# Patient Record
Sex: FEMALE | Race: WHITE | NOT HISPANIC OR LATINO | Employment: FULL TIME | URBAN - METROPOLITAN AREA
[De-identification: names, ages, dates, MRNs, and addresses within clinical notes are randomized per-mention and may not be internally consistent; named-entity substitution may affect disease eponyms.]

---

## 2024-01-09 ENCOUNTER — OFFICE VISIT (OUTPATIENT)
Dept: PRIMARY CARE | Facility: CLINIC | Age: 34
End: 2024-01-09
Payer: COMMERCIAL

## 2024-01-09 VITALS
SYSTOLIC BLOOD PRESSURE: 128 MMHG | RESPIRATION RATE: 18 BRPM | HEART RATE: 89 BPM | DIASTOLIC BLOOD PRESSURE: 80 MMHG | TEMPERATURE: 96.8 F | BODY MASS INDEX: 31.58 KG/M2 | WEIGHT: 185 LBS | OXYGEN SATURATION: 98 % | HEIGHT: 64 IN

## 2024-01-09 DIAGNOSIS — E03.9 HYPOTHYROIDISM, UNSPECIFIED TYPE: ICD-10-CM

## 2024-01-09 DIAGNOSIS — E66.9 OBESITY, UNSPECIFIED CLASSIFICATION, UNSPECIFIED OBESITY TYPE, UNSPECIFIED WHETHER SERIOUS COMORBIDITY PRESENT: Primary | ICD-10-CM

## 2024-01-09 PROCEDURE — 84443 ASSAY THYROID STIM HORMONE: CPT | Performed by: FAMILY MEDICINE

## 2024-01-09 PROCEDURE — 36415 COLL VENOUS BLD VENIPUNCTURE: CPT | Performed by: FAMILY MEDICINE

## 2024-01-09 PROCEDURE — 1036F TOBACCO NON-USER: CPT | Performed by: FAMILY MEDICINE

## 2024-01-09 PROCEDURE — 99213 OFFICE O/P EST LOW 20 MIN: CPT | Performed by: FAMILY MEDICINE

## 2024-01-09 RX ORDER — LEVOTHYROXINE SODIUM 50 UG/1
50 CAPSULE ORAL
Qty: 90 CAPSULE | Refills: 3 | Status: SHIPPED | OUTPATIENT
Start: 2024-01-09

## 2024-01-09 RX ORDER — SEMAGLUTIDE 1.34 MG/ML
INJECTION, SOLUTION SUBCUTANEOUS
COMMUNITY

## 2024-01-09 RX ORDER — LEVOTHYROXINE SODIUM 50 UG/1
CAPSULE ORAL
COMMUNITY
End: 2024-01-09 | Stop reason: SDUPTHER

## 2024-01-09 ASSESSMENT — PAIN SCALES - GENERAL: PAINLEVEL: 0-NO PAIN

## 2024-01-09 ASSESSMENT — PATIENT HEALTH QUESTIONNAIRE - PHQ9
SUM OF ALL RESPONSES TO PHQ9 QUESTIONS 1 AND 2: 0
2. FEELING DOWN, DEPRESSED OR HOPELESS: NOT AT ALL
1. LITTLE INTEREST OR PLEASURE IN DOING THINGS: NOT AT ALL

## 2024-01-09 NOTE — PROGRESS NOTES
"Subjective   Patient ID: Alda Sullivan is a 33 y.o. female who presents for Med Refill (Pt here to discuss medications).    Med Refill         Review of Systems    Objective   /80 (BP Location: Left arm, Patient Position: Sitting, BP Cuff Size: Adult)   Pulse 89   Temp 36 °C (96.8 °F) (Temporal)   Resp 18   Ht 1.626 m (5' 4\")   Wt 83.9 kg (185 lb)   SpO2 98%   BMI 31.76 kg/m²     Physical Exam  Constitutional:       General: She is not in acute distress.     Appearance: Normal appearance.   Cardiovascular:      Rate and Rhythm: Normal rate and regular rhythm.      Heart sounds: No murmur heard.  Pulmonary:      Breath sounds: Normal breath sounds. No wheezing.   Neurological:      Mental Status: She is alert.         Assessment/Plan   Problem List Items Addressed This Visit             ICD-10-CM    Obesity - Primary E66.9          "

## 2024-01-13 LAB — TSH SERPL DL<=0.05 MIU/L-ACNC: 1.89 MIU/L (ref 0.27–4.2)

## 2024-12-05 ENCOUNTER — APPOINTMENT (OUTPATIENT)
Dept: PRIMARY CARE | Facility: CLINIC | Age: 34
End: 2024-12-05
Payer: COMMERCIAL

## 2025-01-11 DIAGNOSIS — E66.9 OBESITY: ICD-10-CM

## 2025-01-13 RX ORDER — SEMAGLUTIDE 2.68 MG/ML
2 INJECTION, SOLUTION SUBCUTANEOUS
Qty: 12 ML | Refills: 3 | Status: SHIPPED | OUTPATIENT
Start: 2025-01-19

## 2025-01-14 ENCOUNTER — APPOINTMENT (OUTPATIENT)
Dept: OBSTETRICS AND GYNECOLOGY | Facility: CLINIC | Age: 35
End: 2025-01-14
Payer: COMMERCIAL

## 2025-01-14 VITALS
DIASTOLIC BLOOD PRESSURE: 72 MMHG | BODY MASS INDEX: 27.83 KG/M2 | SYSTOLIC BLOOD PRESSURE: 124 MMHG | WEIGHT: 163 LBS | HEIGHT: 64 IN

## 2025-01-14 DIAGNOSIS — Z30.9 ENCOUNTER FOR CONTRACEPTIVE MANAGEMENT, UNSPECIFIED TYPE: ICD-10-CM

## 2025-01-14 DIAGNOSIS — Z01.419 WELL WOMAN EXAM: Primary | ICD-10-CM

## 2025-01-14 DIAGNOSIS — Z12.4 CERVICAL CANCER SCREENING: ICD-10-CM

## 2025-01-14 DIAGNOSIS — N93.9 ABNORMAL UTERINE BLEEDING (AUB): ICD-10-CM

## 2025-01-14 PROBLEM — Z98.891 H/O CESAREAN SECTION: Status: RESOLVED | Noted: 2025-01-14 | Resolved: 2025-01-14

## 2025-01-14 PROBLEM — N92.1 MENORRHAGIA WITH IRREGULAR CYCLE: Status: RESOLVED | Noted: 2025-01-14 | Resolved: 2025-01-14

## 2025-01-14 PROBLEM — E55.9 VITAMIN D DEFICIENCY: Status: RESOLVED | Noted: 2025-01-14 | Resolved: 2025-01-14

## 2025-01-14 PROBLEM — G44.209 MUSCLE CONTRACTION HEADACHE: Status: ACTIVE | Noted: 2025-01-14

## 2025-01-14 PROBLEM — E66.9 OBESITY: Status: RESOLVED | Noted: 2024-01-09 | Resolved: 2025-01-14

## 2025-01-14 PROBLEM — N92.6 IRREGULAR MENSES: Status: RESOLVED | Noted: 2025-01-14 | Resolved: 2025-01-14

## 2025-01-14 PROBLEM — E03.9 HYPOTHYROIDISM: Status: ACTIVE | Noted: 2025-01-14

## 2025-01-14 PROBLEM — N94.6 DYSMENORRHEA: Status: RESOLVED | Noted: 2025-01-14 | Resolved: 2025-01-14

## 2025-01-14 PROBLEM — K21.9 GASTRO-ESOPHAGEAL REFLUX DISEASE WITHOUT ESOPHAGITIS: Status: ACTIVE | Noted: 2025-01-14

## 2025-01-14 PROBLEM — R63.2 POLYPHAGIA: Status: RESOLVED | Noted: 2025-01-14 | Resolved: 2025-01-14

## 2025-01-14 PROCEDURE — 99385 PREV VISIT NEW AGE 18-39: CPT | Performed by: OBSTETRICS & GYNECOLOGY

## 2025-01-14 PROCEDURE — 3008F BODY MASS INDEX DOCD: CPT | Performed by: OBSTETRICS & GYNECOLOGY

## 2025-01-14 PROCEDURE — 87624 HPV HI-RISK TYP POOLED RSLT: CPT

## 2025-01-14 PROCEDURE — 1036F TOBACCO NON-USER: CPT | Performed by: OBSTETRICS & GYNECOLOGY

## 2025-01-14 RX ORDER — CALCIUM CARBONATE/VITAMIN D3 500-10/5ML
LIQUID (ML) ORAL
COMMUNITY

## 2025-01-14 RX ORDER — FERROUS SULFATE 325(65) MG
325 TABLET ORAL
COMMUNITY

## 2025-01-14 RX ORDER — ACETAMINOPHEN 500 MG
TABLET ORAL DAILY
COMMUNITY

## 2025-01-14 ASSESSMENT — ENCOUNTER SYMPTOMS
GASTROINTESTINAL NEGATIVE: 1
RESPIRATORY NEGATIVE: 1
MUSCULOSKELETAL NEGATIVE: 1
NEUROLOGICAL NEGATIVE: 1
CONSTITUTIONAL NEGATIVE: 1
CARDIOVASCULAR NEGATIVE: 1
PSYCHIATRIC NEGATIVE: 1

## 2025-01-14 NOTE — PROGRESS NOTES
"Subjective   Alda Sullivan is a 34 y.o. female who is here for a routine exam. Periods are regular every 28-30 days, lasting 8 days. Dysmenorrhea:moderate, occurring throughout menses. Cyclic symptoms include irritability and moodiness. No discharge. She notes excessively heavy periods and frequent bleeding between periods. Denies dyspareunia.    Current contraception: tubal ligation  History of abnormal Pap smear: no  Family history of uterine or ovarian cancer: no  Regular self breast exam: yes  History of abnormal mammogram: no  Family history of breast cancer: no  History of abnormal lipids: no  Menstrual History:  OB History          4    Para   4    Term   4       0    AB   0    Living   4         SAB   0    IAB   0    Ectopic   0    Multiple   0    Live Births   4                  Patient's last menstrual period was 2024.         Review of Systems   Constitutional: Negative.    Respiratory: Negative.     Cardiovascular: Negative.    Gastrointestinal: Negative.    Genitourinary: Negative.    Musculoskeletal: Negative.    Neurological: Negative.    Psychiatric/Behavioral: Negative.         Objective   /72   Ht 1.626 m (5' 4\")   Wt 73.9 kg (163 lb)   LMP 2024   BMI 27.98 kg/m²     General:   alert, appears stated age, and cooperative   Heart: regular rate and rhythm, S1, S2 normal, no murmur, click, rub or gallop   Lungs: clear to auscultation bilaterally   Abdomen: soft, non-tender, without masses or organomegaly   Pelvic: Vulva normal in appearance without discoloration, rashes, lesions. Urethral meatus normal in appearance, without masses. Vagina is negative for blood, discharge, lesions. Uterus is small, mobile, non tender. There is no cervical motion tenderness, adnexal masses/tenderness. Perineum and anus with normal architecture and without rashes, lesions, discoloration.     Breast: No masses, skin changes, discoloration, tenderness, or nipple drainage bilaterally   "   Neck: Normal ROM. Thyroid normal size. No masses/tenderness     Lymph: No LAD   Psych: Normal mood/affect     Assessment/Plan   Problem List Items Addressed This Visit    None  Visit Diagnoses       Well woman exam    -  Primary    Cervical cancer screening        Relevant Orders    THINPREP PAP    Abnormal uterine bleeding (AUB)        Encounter for contraceptive management, unspecified type              1. Pelvic/breast exam wnl, counseled on breast awareness/self exam  2. Pap/cotest pending.  3. Sterilization for contraception    RTO 1 year  Danitza Johnson DO

## 2025-01-14 NOTE — PATIENT INSTRUCTIONS
Routine Gynecologic Visit:  You were seen today for a routine gyn visit with normal findings on exam  You were due for a pap smear today. You should still continue to get annual breast and pelvic exams in the office.  Continue self breast exams/monitoring at home and call for appointment in the office if there are ever masses, skin discoloration, dimpling/puckering of the skin, or nipple drainage when you are not pregnant  If you are having any gynecologic issues in the next year you should call the office. [phone] ([place]) or [phone] ([place])

## 2025-01-16 ENCOUNTER — TELEPHONE (OUTPATIENT)
Dept: OBSTETRICS AND GYNECOLOGY | Facility: HOSPITAL | Age: 35
End: 2025-01-16

## 2025-01-20 ENCOUNTER — APPOINTMENT (OUTPATIENT)
Dept: PRIMARY CARE | Facility: CLINIC | Age: 35
End: 2025-01-20
Payer: COMMERCIAL

## 2025-01-21 ENCOUNTER — PREP FOR PROCEDURE (OUTPATIENT)
Dept: OBSTETRICS AND GYNECOLOGY | Facility: HOSPITAL | Age: 35
End: 2025-01-21
Payer: COMMERCIAL

## 2025-01-21 DIAGNOSIS — N93.9 ABNORMAL UTERINE BLEEDING (AUB): Primary | ICD-10-CM

## 2025-01-22 PROBLEM — N93.9 ABNORMAL UTERINE BLEEDING (AUB): Status: ACTIVE | Noted: 2025-01-21

## 2025-01-27 ENCOUNTER — OFFICE VISIT (OUTPATIENT)
Dept: PRIMARY CARE | Facility: CLINIC | Age: 35
End: 2025-01-27
Payer: COMMERCIAL

## 2025-01-27 VITALS
OXYGEN SATURATION: 100 % | RESPIRATION RATE: 18 BRPM | HEIGHT: 64 IN | WEIGHT: 159.6 LBS | TEMPERATURE: 97.5 F | SYSTOLIC BLOOD PRESSURE: 112 MMHG | DIASTOLIC BLOOD PRESSURE: 62 MMHG | BODY MASS INDEX: 27.25 KG/M2 | HEART RATE: 69 BPM

## 2025-01-27 DIAGNOSIS — E03.9 ACQUIRED HYPOTHYROIDISM: ICD-10-CM

## 2025-01-27 DIAGNOSIS — Z00.00 ANNUAL PHYSICAL EXAM: Primary | ICD-10-CM

## 2025-01-27 DIAGNOSIS — F41.9 ANXIETY: ICD-10-CM

## 2025-01-27 PROCEDURE — 1036F TOBACCO NON-USER: CPT | Performed by: FAMILY MEDICINE

## 2025-01-27 PROCEDURE — 99395 PREV VISIT EST AGE 18-39: CPT | Performed by: FAMILY MEDICINE

## 2025-01-27 PROCEDURE — 3008F BODY MASS INDEX DOCD: CPT | Performed by: FAMILY MEDICINE

## 2025-01-27 RX ORDER — BUPROPION HYDROCHLORIDE 150 MG/1
150 TABLET ORAL EVERY MORNING
Qty: 30 TABLET | Refills: 1 | Status: SHIPPED | OUTPATIENT
Start: 2025-01-27 | End: 2025-03-28

## 2025-01-27 ASSESSMENT — PATIENT HEALTH QUESTIONNAIRE - PHQ9
1. LITTLE INTEREST OR PLEASURE IN DOING THINGS: NOT AT ALL
SUM OF ALL RESPONSES TO PHQ9 QUESTIONS 1 AND 2: 0
2. FEELING DOWN, DEPRESSED OR HOPELESS: NOT AT ALL

## 2025-01-27 ASSESSMENT — PAIN SCALES - GENERAL: PAINLEVEL_OUTOF10: 0-NO PAIN

## 2025-01-27 NOTE — PROGRESS NOTES
"Subjective   Patient ID: Alda Sullivan is a 34 y.o. female who presents for Annual Exam.    HPI     Review of Systems    Objective   /62   Pulse 69   Temp 36.4 °C (97.5 °F)   Resp 18   Ht 1.626 m (5' 4\")   Wt 72.4 kg (159 lb 9.6 oz)   LMP 12/31/2024   SpO2 100%   BMI 27.40 kg/m²     Physical Exam  Vitals and nursing note reviewed.   Constitutional:       General: She is not in acute distress.  HENT:      Right Ear: Tympanic membrane and ear canal normal.      Left Ear: Tympanic membrane and ear canal normal.      Nose: Nose normal. No rhinorrhea.      Mouth/Throat:      Pharynx: Oropharynx is clear. No oropharyngeal exudate or posterior oropharyngeal erythema.      Comments: Dentition wnl  Eyes:      Extraocular Movements: Extraocular movements intact.      Conjunctiva/sclera: Conjunctivae normal.      Pupils: Pupils are equal, round, and reactive to light.   Neck:      Vascular: No carotid bruit.   Cardiovascular:      Rate and Rhythm: Normal rate and regular rhythm.      Heart sounds: Normal heart sounds. No murmur heard.  Pulmonary:      Breath sounds: Normal breath sounds. No wheezing or rhonchi.   Abdominal:      General: Bowel sounds are normal. There is no distension.      Palpations: Abdomen is soft. There is no mass.      Tenderness: There is no abdominal tenderness. There is no guarding or rebound.      Hernia: No hernia is present.   Musculoskeletal:         General: No swelling or tenderness. Normal range of motion.      Cervical back: Normal range of motion and neck supple.   Lymphadenopathy:      Cervical: No cervical adenopathy.   Skin:     General: Skin is warm.      Findings: No rash.   Neurological:      General: No focal deficit present.      Mental Status: She is alert.         Assessment/Plan   Problem List Items Addressed This Visit             ICD-10-CM    Hypothyroidism E03.9    Relevant Orders    TSH with reflex to Free T4 if abnormal     Other Visit Diagnoses         Codes "    Annual physical exam    -  Primary Z00.00    Relevant Orders    CBC and Auto Differential    Comprehensive Metabolic Panel    Lipid Panel    Anxiety     F41.9    Relevant Medications    buPROPion XL (Wellbutrin XL) 150 mg 24 hr tablet    Other Relevant Orders    TSH with reflex to Free T4 if abnormal

## 2025-01-27 NOTE — PROGRESS NOTES
"Subjective   Patient ID: lAda Sullivan is a 34 y.o. female who presents for Annual Exam.    HPI she is fasting     Review of Systems    Objective   /62   Pulse 69   Temp 36.4 °C (97.5 °F)   Resp 18   Ht 1.626 m (5' 4\")   Wt 72.4 kg (159 lb 9.6 oz)   LMP 12/31/2024   SpO2 100%   BMI 27.40 kg/m²     Physical Exam    Assessment/Plan          "

## 2025-01-28 LAB
ALBUMIN SERPL-MCNC: 4 G/DL (ref 3.6–5.1)
ALP SERPL-CCNC: 30 U/L (ref 31–125)
ALT SERPL-CCNC: 16 U/L (ref 6–29)
ANION GAP SERPL CALCULATED.4IONS-SCNC: 11 MMOL/L (CALC) (ref 7–17)
AST SERPL-CCNC: 14 U/L (ref 10–30)
BASOPHILS # BLD AUTO: 49 CELLS/UL (ref 0–200)
BASOPHILS NFR BLD AUTO: 0.6 %
BILIRUB SERPL-MCNC: 2.1 MG/DL (ref 0.2–1.2)
BUN SERPL-MCNC: 10 MG/DL (ref 7–25)
CALCIUM SERPL-MCNC: 9.1 MG/DL (ref 8.6–10.2)
CHLORIDE SERPL-SCNC: 105 MMOL/L (ref 98–110)
CHOLEST SERPL-MCNC: 173 MG/DL
CHOLEST/HDLC SERPL: 2.9 (CALC)
CO2 SERPL-SCNC: 25 MMOL/L (ref 20–32)
CREAT SERPL-MCNC: 0.72 MG/DL (ref 0.5–0.97)
EGFRCR SERPLBLD CKD-EPI 2021: 111 ML/MIN/1.73M2
EOSINOPHIL # BLD AUTO: 41 CELLS/UL (ref 15–500)
EOSINOPHIL NFR BLD AUTO: 0.5 %
ERYTHROCYTE [DISTWIDTH] IN BLOOD BY AUTOMATED COUNT: 11.9 % (ref 11–15)
GLUCOSE SERPL-MCNC: 86 MG/DL (ref 65–99)
HCT VFR BLD AUTO: 40.8 % (ref 35–45)
HDLC SERPL-MCNC: 59 MG/DL
HGB BLD-MCNC: 13.8 G/DL (ref 11.7–15.5)
LDLC SERPL CALC-MCNC: 99 MG/DL (CALC)
LYMPHOCYTES # BLD AUTO: 2633 CELLS/UL (ref 850–3900)
LYMPHOCYTES NFR BLD AUTO: 32.5 %
MCH RBC QN AUTO: 31.4 PG (ref 27–33)
MCHC RBC AUTO-ENTMCNC: 33.8 G/DL (ref 32–36)
MCV RBC AUTO: 92.9 FL (ref 80–100)
MONOCYTES # BLD AUTO: 437 CELLS/UL (ref 200–950)
MONOCYTES NFR BLD AUTO: 5.4 %
NEUTROPHILS # BLD AUTO: 4941 CELLS/UL (ref 1500–7800)
NEUTROPHILS NFR BLD AUTO: 61 %
NONHDLC SERPL-MCNC: 114 MG/DL (CALC)
PLATELET # BLD AUTO: 246 THOUSAND/UL (ref 140–400)
PMV BLD REES-ECKER: 10.9 FL (ref 7.5–12.5)
POTASSIUM SERPL-SCNC: 4.2 MMOL/L (ref 3.5–5.3)
PROT SERPL-MCNC: 5.7 G/DL (ref 6.1–8.1)
RBC # BLD AUTO: 4.39 MILLION/UL (ref 3.8–5.1)
SODIUM SERPL-SCNC: 141 MMOL/L (ref 135–146)
TRIGL SERPL-MCNC: 67 MG/DL
TSH SERPL-ACNC: 2.65 MIU/L
WBC # BLD AUTO: 8.1 THOUSAND/UL (ref 3.8–10.8)

## 2025-01-30 ENCOUNTER — APPOINTMENT (OUTPATIENT)
Dept: OBSTETRICS AND GYNECOLOGY | Facility: CLINIC | Age: 35
End: 2025-01-30
Payer: COMMERCIAL

## 2025-02-10 ENCOUNTER — APPOINTMENT (OUTPATIENT)
Dept: OBSTETRICS AND GYNECOLOGY | Facility: CLINIC | Age: 35
End: 2025-02-10
Payer: COMMERCIAL

## 2025-02-10 VITALS — WEIGHT: 161 LBS | SYSTOLIC BLOOD PRESSURE: 100 MMHG | BODY MASS INDEX: 27.64 KG/M2 | DIASTOLIC BLOOD PRESSURE: 68 MMHG

## 2025-02-10 DIAGNOSIS — Z32.02 PREGNANCY TEST NEGATIVE: ICD-10-CM

## 2025-02-10 DIAGNOSIS — N93.9 ABNORMAL UTERINE BLEEDING (AUB): Primary | ICD-10-CM

## 2025-02-10 LAB — PREGNANCY TEST URINE, POC: NEGATIVE

## 2025-02-10 PROCEDURE — 81025 URINE PREGNANCY TEST: CPT | Performed by: OBSTETRICS & GYNECOLOGY

## 2025-02-10 PROCEDURE — 88305 TISSUE EXAM BY PATHOLOGIST: CPT

## 2025-02-10 PROCEDURE — 58100 BIOPSY OF UTERUS LINING: CPT | Performed by: OBSTETRICS & GYNECOLOGY

## 2025-02-10 PROCEDURE — 88305 TISSUE EXAM BY PATHOLOGIST: CPT | Performed by: STUDENT IN AN ORGANIZED HEALTH CARE EDUCATION/TRAINING PROGRAM

## 2025-02-10 NOTE — PATIENT INSTRUCTIONS
Endometrial Biopsy Instructions:  You were seen in the office today for abnormal uterine bleeding and had an endometrial biopsy done  A nerve block was done at the time of the biopsy to help with pain management but you may have some cramping today. You may take Tylenol or Ibuprofen for any cramping/discomfort  You will likely have some bleeding today that should be light and taper off/stop within a few days  You will receive a call/[employer] message when your results are back  Please call the office for any excessively heavy bleeding (soaking through more than one pad per hour), pain that is not controlled on Ibuprofen/Tylenol, foul smelling vaginal discharge, vaginal itching, vulvar rashes, vaginal odor, pain with urination. [phone] ([place]) or [phone] ([place])

## 2025-02-10 NOTE — PROGRESS NOTES
Patient ID: Alda Sullivan is a 34 y.o. female.    Biopsy endometrium    Date/Time: 2/10/2025 10:59 AM    Performed by: Danitza Johnson DO  Authorized by: Danitza Johnson DO    Consent:     Consent obtained: written    Risks discussed: bleeding, infection and need for repeat procedure    Alternatives discussed: observation, referral and alternative treatment    Patient agrees, verbalizes understanding, and wants to proceed: yes      Procedure explained and questions answered to patient or proxy's satisfaction: yes    Indications:     Indications: abnormal uterine bleeding      Chronicity of post-menopausal bleeding: chronic    Progression of post-menopausal bleeding: unchanged  Pre-procedure:     Urine pregnancy test: negative      Premeds: NSAID    Procedure:     Prepped with: Betadine    Tenaculum used: yes      A local block was performed: yes      Block method: paracervical block      Local anesthetic: lidocaine 1% w/o epi    Amount used (mL): 3    Cervix dilated: no      Number of passes: 1  Findings:     Cervix: normal      Uterus depth by sound (cm): 7    Specimen collected: specimen collected and sent to pathology      Patient tolerance: tolerated well, no immediate complications  Danitza Johnson DO

## 2025-02-15 DIAGNOSIS — E03.9 HYPOTHYROIDISM, UNSPECIFIED TYPE: ICD-10-CM

## 2025-02-17 RX ORDER — LEVOTHYROXINE SODIUM 50 UG/1
50 CAPSULE ORAL
Qty: 90 CAPSULE | Refills: 2 | Status: SHIPPED | OUTPATIENT
Start: 2025-02-17

## 2025-02-18 LAB
LABORATORY COMMENT REPORT: NORMAL
PATH REPORT.FINAL DX SPEC: NORMAL
PATH REPORT.GROSS SPEC: NORMAL
PATH REPORT.RELEVANT HX SPEC: NORMAL
PATH REPORT.TOTAL CANCER: NORMAL

## 2025-02-24 ENCOUNTER — OFFICE VISIT (OUTPATIENT)
Dept: PRIMARY CARE | Facility: CLINIC | Age: 35
End: 2025-02-24
Payer: COMMERCIAL

## 2025-02-24 VITALS
WEIGHT: 159.2 LBS | BODY MASS INDEX: 27.18 KG/M2 | HEIGHT: 64 IN | DIASTOLIC BLOOD PRESSURE: 62 MMHG | OXYGEN SATURATION: 95 % | HEART RATE: 88 BPM | RESPIRATION RATE: 18 BRPM | SYSTOLIC BLOOD PRESSURE: 112 MMHG | TEMPERATURE: 97.5 F

## 2025-02-24 DIAGNOSIS — F41.9 ANXIETY: Primary | ICD-10-CM

## 2025-02-24 DIAGNOSIS — N93.9 ABNORMAL UTERINE BLEEDING: ICD-10-CM

## 2025-02-24 LAB
POC BINAX EXPIRATION: NORMAL
POC BINAX NOW COVID SERIAL NUMBER: NORMAL
POC SARS-COV-2 AG BINAX: NORMAL

## 2025-02-24 PROCEDURE — 1036F TOBACCO NON-USER: CPT | Performed by: FAMILY MEDICINE

## 2025-02-24 PROCEDURE — 3008F BODY MASS INDEX DOCD: CPT | Performed by: FAMILY MEDICINE

## 2025-02-24 PROCEDURE — 87811 SARS-COV-2 COVID19 W/OPTIC: CPT | Performed by: FAMILY MEDICINE

## 2025-02-24 PROCEDURE — 99213 OFFICE O/P EST LOW 20 MIN: CPT | Performed by: FAMILY MEDICINE

## 2025-02-24 RX ORDER — BUPROPION HYDROCHLORIDE 150 MG/1
150 TABLET ORAL EVERY MORNING
Qty: 30 TABLET | Refills: 11 | Status: SHIPPED | OUTPATIENT
Start: 2025-02-24 | End: 2026-02-19

## 2025-02-24 ASSESSMENT — PAIN SCALES - GENERAL: PAINLEVEL_OUTOF10: 0-NO PAIN

## 2025-02-24 NOTE — PROGRESS NOTES
"Subjective   Patient ID: Alda Sullivan is a 34 y.o. female who presents for Follow-up.    HPI pt here for recheck on wellbutrin she states it is helping     Review of Systems    Objective   /62   Pulse 88   Temp 36.4 °C (97.5 °F)   Resp 18   Ht 1.626 m (5' 4\")   Wt 72.2 kg (159 lb 3.2 oz)   LMP 01/26/2025 (Exact Date)   SpO2 95%   BMI 27.33 kg/m²     Physical Exam  Constitutional:       General: She is not in acute distress.     Appearance: Normal appearance.   Cardiovascular:      Rate and Rhythm: Normal rate and regular rhythm.      Heart sounds: No murmur heard.  Pulmonary:      Breath sounds: Normal breath sounds. No wheezing.   Neurological:      Mental Status: She is alert.         Assessment/Plan   Problem List Items Addressed This Visit    None  Visit Diagnoses         Codes    Anxiety    -  Primary F41.9    Relevant Medications    buPROPion XL (Wellbutrin XL) 150 mg 24 hr tablet               "

## 2025-02-25 ENCOUNTER — ANESTHESIA EVENT (OUTPATIENT)
Dept: OPERATING ROOM | Facility: HOSPITAL | Age: 35
End: 2025-02-25
Payer: COMMERCIAL

## 2025-02-25 RX ORDER — ALBUTEROL SULFATE 0.83 MG/ML
2.5 SOLUTION RESPIRATORY (INHALATION) ONCE AS NEEDED
Status: CANCELLED | OUTPATIENT
Start: 2025-02-25

## 2025-02-25 RX ORDER — MEPERIDINE HYDROCHLORIDE 25 MG/ML
12.5 INJECTION INTRAMUSCULAR; INTRAVENOUS; SUBCUTANEOUS EVERY 10 MIN PRN
Status: CANCELLED | OUTPATIENT
Start: 2025-02-25

## 2025-02-25 RX ORDER — ONDANSETRON HYDROCHLORIDE 2 MG/ML
4 INJECTION, SOLUTION INTRAVENOUS ONCE AS NEEDED
Status: CANCELLED | OUTPATIENT
Start: 2025-02-25

## 2025-02-25 RX ORDER — OXYCODONE HYDROCHLORIDE 5 MG/1
5 TABLET ORAL EVERY 4 HOURS PRN
Status: CANCELLED | OUTPATIENT
Start: 2025-02-25

## 2025-02-25 RX ORDER — SODIUM CHLORIDE, SODIUM LACTATE, POTASSIUM CHLORIDE, CALCIUM CHLORIDE 600; 310; 30; 20 MG/100ML; MG/100ML; MG/100ML; MG/100ML
100 INJECTION, SOLUTION INTRAVENOUS CONTINUOUS
Status: CANCELLED | OUTPATIENT
Start: 2025-02-25 | End: 2025-02-26

## 2025-02-25 RX ORDER — DROPERIDOL 2.5 MG/ML
0.62 INJECTION, SOLUTION INTRAMUSCULAR; INTRAVENOUS ONCE AS NEEDED
Status: CANCELLED | OUTPATIENT
Start: 2025-02-25

## 2025-02-25 RX ORDER — DIPHENHYDRAMINE HYDROCHLORIDE 50 MG/ML
12.5 INJECTION INTRAMUSCULAR; INTRAVENOUS ONCE AS NEEDED
Status: CANCELLED | OUTPATIENT
Start: 2025-02-25

## 2025-02-26 ENCOUNTER — ANESTHESIA (OUTPATIENT)
Dept: OPERATING ROOM | Facility: HOSPITAL | Age: 35
End: 2025-02-26
Payer: COMMERCIAL

## 2025-02-26 ENCOUNTER — HOSPITAL ENCOUNTER (OUTPATIENT)
Facility: HOSPITAL | Age: 35
Setting detail: OUTPATIENT SURGERY
Discharge: HOME | End: 2025-02-26
Attending: OBSTETRICS & GYNECOLOGY | Admitting: OBSTETRICS & GYNECOLOGY
Payer: COMMERCIAL

## 2025-02-26 VITALS
WEIGHT: 153.88 LBS | OXYGEN SATURATION: 100 % | DIASTOLIC BLOOD PRESSURE: 68 MMHG | SYSTOLIC BLOOD PRESSURE: 115 MMHG | HEIGHT: 64 IN | BODY MASS INDEX: 26.27 KG/M2 | RESPIRATION RATE: 15 BRPM | HEART RATE: 78 BPM | TEMPERATURE: 97.7 F

## 2025-02-26 DIAGNOSIS — N93.9 ABNORMAL UTERINE BLEEDING (AUB): Primary | ICD-10-CM

## 2025-02-26 DIAGNOSIS — Z98.890 S/P ENDOMETRIAL ABLATION: ICD-10-CM

## 2025-02-26 PROBLEM — E55.9 VITAMIN D DEFICIENCY: Status: ACTIVE | Noted: 2018-07-24

## 2025-02-26 PROBLEM — N94.6 DYSMENORRHEA: Status: RESOLVED | Noted: 2018-08-16 | Resolved: 2025-02-26

## 2025-02-26 PROBLEM — R63.2 POLYPHAGIA: Status: ACTIVE | Noted: 2025-01-14

## 2025-02-26 LAB — PREGNANCY TEST URINE, POC: NEGATIVE

## 2025-02-26 PROCEDURE — 2500000005 HC RX 250 GENERAL PHARMACY W/O HCPCS: Performed by: OBSTETRICS & GYNECOLOGY

## 2025-02-26 PROCEDURE — 3700000001 HC GENERAL ANESTHESIA TIME - INITIAL BASE CHARGE: Performed by: OBSTETRICS & GYNECOLOGY

## 2025-02-26 PROCEDURE — 2500000004 HC RX 250 GENERAL PHARMACY W/ HCPCS (ALT 636 FOR OP/ED): Performed by: ANESTHESIOLOGY

## 2025-02-26 PROCEDURE — 3600000008 HC OR TIME - EACH INCREMENTAL 1 MINUTE - PROCEDURE LEVEL THREE: Performed by: OBSTETRICS & GYNECOLOGY

## 2025-02-26 PROCEDURE — 2500000004 HC RX 250 GENERAL PHARMACY W/ HCPCS (ALT 636 FOR OP/ED): Performed by: NURSE ANESTHETIST, CERTIFIED REGISTERED

## 2025-02-26 PROCEDURE — A58563 PR HYSTEROSCOPY,W/ENDOMETRIAL ABLATION: Performed by: NURSE ANESTHETIST, CERTIFIED REGISTERED

## 2025-02-26 PROCEDURE — C1889 IMPLANT/INSERT DEVICE, NOC: HCPCS | Performed by: OBSTETRICS & GYNECOLOGY

## 2025-02-26 PROCEDURE — 7100000009 HC PHASE TWO TIME - INITIAL BASE CHARGE: Performed by: OBSTETRICS & GYNECOLOGY

## 2025-02-26 PROCEDURE — 3700000002 HC GENERAL ANESTHESIA TIME - EACH INCREMENTAL 1 MINUTE: Performed by: OBSTETRICS & GYNECOLOGY

## 2025-02-26 PROCEDURE — 2720000007 HC OR 272 NO HCPCS: Performed by: OBSTETRICS & GYNECOLOGY

## 2025-02-26 PROCEDURE — 3600000003 HC OR TIME - INITIAL BASE CHARGE - PROCEDURE LEVEL THREE: Performed by: OBSTETRICS & GYNECOLOGY

## 2025-02-26 PROCEDURE — 7100000002 HC RECOVERY ROOM TIME - EACH INCREMENTAL 1 MINUTE: Performed by: OBSTETRICS & GYNECOLOGY

## 2025-02-26 PROCEDURE — 7100000001 HC RECOVERY ROOM TIME - INITIAL BASE CHARGE: Performed by: OBSTETRICS & GYNECOLOGY

## 2025-02-26 PROCEDURE — 7100000010 HC PHASE TWO TIME - EACH INCREMENTAL 1 MINUTE: Performed by: OBSTETRICS & GYNECOLOGY

## 2025-02-26 PROCEDURE — 58563 HYSTEROSCOPY ABLATION: CPT | Performed by: OBSTETRICS & GYNECOLOGY

## 2025-02-26 RX ORDER — SODIUM CHLORIDE, SODIUM LACTATE, POTASSIUM CHLORIDE, CALCIUM CHLORIDE 600; 310; 30; 20 MG/100ML; MG/100ML; MG/100ML; MG/100ML
20 INJECTION, SOLUTION INTRAVENOUS CONTINUOUS
Status: DISCONTINUED | OUTPATIENT
Start: 2025-02-26 | End: 2025-02-26 | Stop reason: HOSPADM

## 2025-02-26 RX ORDER — KETOROLAC TROMETHAMINE 30 MG/ML
INJECTION, SOLUTION INTRAMUSCULAR; INTRAVENOUS AS NEEDED
Status: DISCONTINUED | OUTPATIENT
Start: 2025-02-26 | End: 2025-02-26

## 2025-02-26 RX ORDER — IBUPROFEN 600 MG/1
600 TABLET ORAL EVERY 6 HOURS PRN
COMMUNITY
Start: 2025-02-26 | End: 2025-03-08

## 2025-02-26 RX ORDER — ACETAMINOPHEN 325 MG/1
650 TABLET ORAL EVERY 6 HOURS PRN
COMMUNITY
Start: 2025-02-26 | End: 2025-03-08

## 2025-02-26 RX ORDER — FENTANYL CITRATE 50 UG/ML
INJECTION, SOLUTION INTRAMUSCULAR; INTRAVENOUS AS NEEDED
Status: DISCONTINUED | OUTPATIENT
Start: 2025-02-26 | End: 2025-02-26

## 2025-02-26 RX ORDER — SODIUM CHLORIDE 0.9 G/100ML
INJECTION, SOLUTION IRRIGATION AS NEEDED
Status: DISCONTINUED | OUTPATIENT
Start: 2025-02-26 | End: 2025-02-26 | Stop reason: HOSPADM

## 2025-02-26 RX ORDER — MIDAZOLAM HYDROCHLORIDE 1 MG/ML
INJECTION INTRAMUSCULAR; INTRAVENOUS AS NEEDED
Status: DISCONTINUED | OUTPATIENT
Start: 2025-02-26 | End: 2025-02-26

## 2025-02-26 RX ORDER — ONDANSETRON HYDROCHLORIDE 2 MG/ML
INJECTION, SOLUTION INTRAVENOUS AS NEEDED
Status: DISCONTINUED | OUTPATIENT
Start: 2025-02-26 | End: 2025-02-26

## 2025-02-26 RX ORDER — PROPOFOL 10 MG/ML
INJECTION, EMULSION INTRAVENOUS AS NEEDED
Status: DISCONTINUED | OUTPATIENT
Start: 2025-02-26 | End: 2025-02-26

## 2025-02-26 RX ADMIN — ONDANSETRON 4 MG: 2 INJECTION, SOLUTION INTRAMUSCULAR; INTRAVENOUS at 07:33

## 2025-02-26 RX ADMIN — FENTANYL CITRATE 100 MCG: 50 INJECTION, SOLUTION INTRAMUSCULAR; INTRAVENOUS at 07:39

## 2025-02-26 RX ADMIN — SODIUM CHLORIDE, POTASSIUM CHLORIDE, SODIUM LACTATE AND CALCIUM CHLORIDE 20 ML/HR: 600; 310; 30; 20 INJECTION, SOLUTION INTRAVENOUS at 07:00

## 2025-02-26 RX ADMIN — DEXAMETHASONE SODIUM PHOSPHATE 4 MG: 4 INJECTION INTRA-ARTICULAR; INTRALESIONAL; INTRAMUSCULAR; INTRAVENOUS; SOFT TISSUE at 07:42

## 2025-02-26 RX ADMIN — PROPOFOL 200 MG: 10 INJECTION, EMULSION INTRAVENOUS at 07:39

## 2025-02-26 RX ADMIN — MIDAZOLAM HYDROCHLORIDE 1 MG: 1 INJECTION, SOLUTION INTRAMUSCULAR; INTRAVENOUS at 07:35

## 2025-02-26 RX ADMIN — KETOROLAC TROMETHAMINE 30 MG: 30 INJECTION, SOLUTION INTRAMUSCULAR; INTRAVENOUS at 07:33

## 2025-02-26 SDOH — HEALTH STABILITY: MENTAL HEALTH: CURRENT SMOKER: 0

## 2025-02-26 ASSESSMENT — PAIN SCALES - GENERAL
PAINLEVEL_OUTOF10: 0 - NO PAIN
PAIN_LEVEL: 2

## 2025-02-26 ASSESSMENT — PAIN - FUNCTIONAL ASSESSMENT
PAIN_FUNCTIONAL_ASSESSMENT: 0-10

## 2025-02-26 NOTE — ANESTHESIA POSTPROCEDURE EVALUATION
Patient: Alda Sullivan    Procedure Summary       Date: 02/26/25 Room / Location: [facility] / [facility]    Anesthesia Start: 0727 Anesthesia Stop: 0812    Procedures:       HYSTEROSCOPY      ABLATION, ENDOMETRIUM, WITH HYSTEROSCOPY Diagnosis:       Abnormal uterine bleeding (AUB)      (Abnormal uterine bleeding (AUB) [N93.9])    Surgeons: Danitza Johnson DO Responsible Provider: Eduardo Aragon MD    Anesthesia Type: general ASA Status: 2            Anesthesia Type: general    Vitals Value Taken Time   /68 02/26/25 0830   Temp 36.5 °C (97.7 °F) 02/26/25 0808   Pulse 78 02/26/25 0830   Resp 15 02/26/25 0830   SpO2 100 % 02/26/25 0830       Anesthesia Post Evaluation    Patient location during evaluation: bedside  Patient participation: complete - patient participated  Level of consciousness: awake and alert  Pain score: 2  Pain management: adequate  Airway patency: patent  Cardiovascular status: acceptable  Respiratory status: acceptable  Hydration status: acceptable  Postoperative Nausea and Vomiting: none    No notable events documented.

## 2025-02-26 NOTE — ANESTHESIA PROCEDURE NOTES
Airway  Date/Time: 2/26/2025 7:41 AM  Urgency: elective    Airway not difficult    Staffing  Performed: CRNA   Authorized by: Eduardo Aragon MD    Performed by: HIRAL Flores-ANNALISE  Patient location during procedure: OR    Indications and Patient Condition  Indications for airway management: anesthesia  Spontaneous Ventilation: absent  Sedation level: deep  Preoxygenated: yes  Patient position: sniffing  MILS maintained throughout  Mask difficulty assessment: 0 - not attempted  Planned trial extubation    Final Airway Details  Final airway type: supraglottic airway      Successful airway: Supraglottic airway: iGel.  Size 3     Number of attempts at approach: 1  Number of other approaches attempted: 0           DISPLAY PLAN FREE TEXT

## 2025-02-26 NOTE — H&P
Pre- Surgical History and Physical    HPI:  Patient presents for surgical visit. She is scheduled for hysteroscopy/D&C/ablation on 25 for the following diagnosis/diagnoses: AUB. There has been no recent change in her health. She denies recent pelvic pain, discharge, itching, burning, rash, dysuria, odor. She is sexually active and denies dyspareunia. She denies recent new sexual partners. She uses sterilization for contraception. Endometrial sampling was indicated, normal endometrium noted on pathology.       Obstetrical History   OB History    Para Term  AB Living   4 4 4 0 0 4   SAB IAB Ectopic Multiple Live Births   0 0 0 0 4      # Outcome Date GA Lbr Christopher/2nd Weight Sex Type Anes PTL Lv   4 Term 04/10/19     CS-LTranv      3 Term 05/10/14     CS-LTranv      2 Term 12     CS-LTranv      1 Term 10/15/09 38w0d  3.657 kg M CS-Unspec  N BENOIT       Past Medical History  Past Medical History:   Diagnosis Date    Abnormal uterine bleeding     Anxiety     Rx wellbutrin    Dysmenorrhea 2025    H/O  section 2025    Hypothyroidism 2018    Irregular menses 2025    Menorrhagia with irregular cycle 2025    Vitamin D deficiency 2025        Past Surgical History   Past Surgical History:   Procedure Laterality Date     SECTION, LOW TRANSVERSE  10/15/2009     SECTION, LOW TRANSVERSE  2012     SECTION, LOW TRANSVERSE  04/10/2019    OTHER SURGICAL HISTORY  05/10/2014     section    OTHER SURGICAL HISTORY  2021    Tubal ligation    TUBAL LIGATION  4/10/2019       Social History  Social History     Tobacco Use    Smoking status: Never    Smokeless tobacco: Never   Substance Use Topics    Alcohol use: Yes     Alcohol/week: 1.0 standard drink of alcohol     Types: 1 Standard drinks or equivalent per week     Comment: occaisonal     Substance and Sexual Activity   Drug Use Never       Allergies  Patient has no known allergies.      Medications  Medications Prior to Admission   Medication Sig Dispense Refill Last Dose/Taking    buPROPion XL (Wellbutrin XL) 150 mg 24 hr tablet Take 1 tablet (150 mg) by mouth once daily in the morning. Do not crush, chew, or split. 30 tablet 11 2/25/2025 Morning    levothyroxine (Tirosint) 50 mcg capsule TAKE 1 CAPSULE (50 MCG) BY MOUTH ONCE DAILY IN THE MORNING. TAKE BEFORE MEALS. 90 capsule 2 2/25/2025 Morning    magnesium oxide 400 mg magnesium capsule Take by mouth.   2/25/2025 Bedtime    NON FORMULARY once daily. [place]afide   2/25/2025 Morning    prenatal vit,calc76-iron-folic (Prenatabs Rx) 29 mg iron- 1 mg tablet 1 tablet once daily.   2/25/2025 Morning    semaglutide (Ozempic) 2 mg/dose (8 mg/3 mL) pen injector INJECT 2 MG SUBCUTANEOUSLY WEEKLY 12 mL 3 2/10/2025    cholecalciferol (Vitamin D3) 5,000 Units tablet Take by mouth once daily. (Patient not taking: Reported on 2/26/2025)   Not Taking    ferrous sulfate, 325 mg ferrous sulfate, tablet Take 1 tablet (325 mg) by mouth once daily with breakfast. (Patient not taking: Reported on 2/26/2025)   Not Taking         Objective    Last Vitals  Temp Pulse Resp BP MAP O2 Sat   37.2 °C (99 °F) 83 16 113/76   100 %     Physical Examination  GENERAL: Examination reveals a well developed, well nourished, gravid female in no acute distress. She is alert and cooperative.  LUNGS: clear to auscultation bilaterally  HEART: regular rate and rhythm, S1, S2 normal, no murmur, click, rub or gallop  ABDOMEN: soft, NT, ND  VAGINA: Deferred to OR  EXTREMITIES: no redness or tenderness in the calves or thighs, no edema  SKIN: normal coloration and turgor, no rashes  NEUROLOGICAL: alert, oriented, normal speech, no focal findings or movement disorder noted  PSYCHOLOGICAL: awake and alert; oriented to person, place, and time    Lab Review  Labs in chart were reviewed.    A/P:  Ad[facility] through [facility]  Insert IV, IVF, NPO  CBC, T&S  Antibiotic prophylaxis not  indicated  General anesthesia  SCD to be placed in pre-op    Danitza Johnson DO

## 2025-02-26 NOTE — DISCHARGE INSTRUCTIONS
[facility]  [address], [place] [address]  [address], [place] [address]  Tel: [phone] ([place]) or [phone] ([place])    Home Going Instructions after Hysteroscopy/D&C:    Activity:   We do not recommend any exercise on the day of your procedure.  You may return to work the following day.  You may have light bleeding or spotting for several days after procedure.   Use only sanitary pads for bleeding, do not use tampons until you have no further bleeding.   Please abstain from intercourse until you have no further bleeding.     Anesthesia:  Drink small amounts of liquids initially and then slowly increase your intake of food. Drinking fluids will keep your bowels regular.   Avoid foods that are sweet, spicy or hard to digest today.  If you feel nauseated, rest your stomach for one hour, and then try drinking clear liquids again.  You may take a stool softener or miralax/milk of magnesia to help with constipation that may occur after anesthesia.  Please make sure a responsible adult is with you for at least 24 hours after surgery and do not drive or make important decisions during this time. Anesthesia may affect your judgment, coordination, and reaction time.    Pain:  If you experience any post-op pain, pain can be managed by taking Ibuprofen and/or Tylenol.    Follow up:  Follow up in our office in 2 weeks to discuss pathology and review next steps in plan of care    When to call your provider:  Vaginal bleeding that is more than a normal period that doesn't taper down.  Bleeding through 1 sanitary pad an hour.  Any clots the size of an egg or bigger.  Fever of 100.4 or higher with chills.  Unusual or foul smelling discharge.  Worsening abdominal pain.    Danitza Johnson, DO

## 2025-02-26 NOTE — OP NOTE
Date: 2025  OR Location: [facility] OR    Name: Alda Sullivan, : 1990, Age: 34 y.o., MRN: [id number], Sex: female    Diagnosis  Pre-op Diagnosis      * Abnormal uterine bleeding (AUB) [N93.9] Post-op Diagnosis     * Abnormal uterine bleeding (AUB) [N93.9]     Procedures  HYSTEROSCOPY  89504 - [place] HYSTEROSCOPY DIAGNOSTIC SEPARATE PROCEDURE    ABLATION, ENDOMETRIUM, WITH HYSTEROSCOPY  35782 - [place] HYSTEROSCOPY ENDOMETRIAL ABLATION      Surgeons      * Danitza Johnson - Primary    Resident/Fellow/Other Assistant:  Surgeons and Role:  * No surgeons found with a matching role *    Staff:   Circulator: Genia [place] Person: Cordell  Surgical Assistant: Anna    Anesthesia Staff: Anesthesiologist: Eduardo Aragon MD  CRNA: HIRAL Flores-ANNALISE    Procedure Summary  Anesthesia: General  ASA: II  Estimated Blood Loss: 5 mL  Intra-op Medications:   Administrations occurring from 0715 to 0830 on 25:   Medication Name Total Dose   sodium chloride 0.9 % irrigation solution 300 mL   dexAMETHasone (Decadron) injection 4 mg/mL 4 mg   fentaNYL (Sublimaze) injection 50 mcg/mL 100 mcg   ketorolac (Toradol) injection 30 mg 30 mg   midazolam PF (Versed) injection 1 mg/mL 1 mg   ondansetron (Zofran) 2 mg/mL injection 4 mg   propofol (Diprivan) injection 10 mg/mL 200 mg              Anesthesia Record               Intraprocedure I/O Totals       None           Specimen: No specimens collected              Procedure Details:  The patient was seen in the preoperative area. The site of surgery was properly noted/marked if necessary per policy. The patient has been actively warmed in preoperative area. Preoperative antibiotics are not indicated. Venous thrombosis prophylaxis have been ordered including bilateral sequential compression devices    Findings: Small, grossly normal uterine cavity, ostia visualized bilaterally.       Patient presented and was taken to the OR where anesthesia was induced without issue. She  was positioned dorsal lithotomy with feet in Yellow [place] stirrups and prepped and draped in the usual sterile fashion. A speculum was placed in the vagina and the cervix identified and grasped at the anterior lip with a single tooth tenaculum. The cervix was dilated to allow insertion of a hysteroscope. Further cervical dilation was achieved with hydrodissection of the scope. The scope was advanced into the cavity and hysteroscopy done with the above findings noted. The scope was withdrawn and the cervix dilated further. The uterine cavity length and cervical length were measured using a sound and Hegar dilator. The Novasure device was inserted and advanced into the uterine cavity. The device was deployed and a cavity width obtained. All measurements were entered into the Novasure terminal and a cavity assessment done. The device was then activated and cautery completed for 55 seconds. The device was collapsed and withdrawn. The Harris and tenaculum were removed from the vagina and hemostasis noted. The patient was taken out of dorsal lithotomy and anesthesia reversed without issue. The patient was moved back to her PACU bed and taken to the PACU in good condition. All counts were correct.      Complications:  None; patient tolerated the procedure well.     Disposition: PACU - hemodynamically stable.  Condition: stable  Danitza Johnson DO

## 2025-02-26 NOTE — ANESTHESIA PREPROCEDURE EVALUATION
Patient: Alda Sullivan    Procedure Information       Date/Time: 02/26/25 0715    Procedures:       HYSTEROSCOPY      DILATION AND CURETTAGE, UTERUS, DIAGNOSTIC    Location: [facility] OR [facility] / [facility]    Surgeons: Danitza Johnson, DO            Relevant Problems   Anesthesia (within normal limits)      Cardiac (within normal limits)      Pulmonary (within normal limits)      Neuro (within normal limits)      GI   (+) Gastro-esophageal reflux disease without esophagitis      /Renal (within normal limits)      Liver (within normal limits)      Endocrine   (+) Hypothyroidism      Hematology (within normal limits)      Musculoskeletal (within normal limits)      HEENT (within normal limits)      ID (within normal limits)      Skin (within normal limits)      GYN   (+) Abnormal uterine bleeding (AUB)       Clinical information reviewed:   Tobacco  Allergies   Problems  Med Hx  Surg Hx   Fam Hx  Soc Hx        NPO Detail:  NPO/Void Status  Date of Last Liquid: 02/25/25  Time of Last Liquid: 2000  Date of Last Solid: 02/25/25  Time of Last Solid: 2000  Last Intake Type: Clear fluids  Time of Last Void: 0630         Physical Exam    Airway  Mallampati: I  TM distance: >3 FB     Cardiovascular - normal exam  Rhythm: regular  Rate: normal     Dental - normal exam     Pulmonary - normal exam  Breath sounds clear to auscultation     Abdominal - normal exam           Anesthesia Plan    History of general anesthesia?: no  History of complications of general anesthesia?: no    ASA 2     general     The patient is not a current smoker.    intravenous induction   Anesthetic plan and risks discussed with patient.    Plan discussed with CRNA and attending.

## 2025-02-27 ENCOUNTER — APPOINTMENT (OUTPATIENT)
Dept: OBSTETRICS AND GYNECOLOGY | Facility: CLINIC | Age: 35
End: 2025-02-27
Payer: COMMERCIAL

## 2025-03-13 ENCOUNTER — TELEPHONE (OUTPATIENT)
Dept: OBSTETRICS AND GYNECOLOGY | Facility: CLINIC | Age: 35
End: 2025-03-13

## 2025-03-13 ENCOUNTER — OFFICE VISIT (OUTPATIENT)
Dept: OBSTETRICS AND GYNECOLOGY | Facility: CLINIC | Age: 35
End: 2025-03-13
Payer: COMMERCIAL

## 2025-03-13 VITALS — DIASTOLIC BLOOD PRESSURE: 60 MMHG | WEIGHT: 158.8 LBS | SYSTOLIC BLOOD PRESSURE: 110 MMHG | BODY MASS INDEX: 27.26 KG/M2

## 2025-03-13 DIAGNOSIS — Z98.890 S/P ENDOMETRIAL ABLATION: Primary | ICD-10-CM

## 2025-03-13 PROCEDURE — 99213 OFFICE O/P EST LOW 20 MIN: CPT | Performed by: OBSTETRICS & GYNECOLOGY

## 2025-03-13 ASSESSMENT — ENCOUNTER SYMPTOMS
GASTROINTESTINAL NEGATIVE: 1
PSYCHIATRIC NEGATIVE: 1
RESPIRATORY NEGATIVE: 1
MUSCULOSKELETAL NEGATIVE: 1
CARDIOVASCULAR NEGATIVE: 1
CONSTITUTIONAL NEGATIVE: 1
NEUROLOGICAL NEGATIVE: 1

## 2025-03-13 NOTE — PATIENT INSTRUCTIONS
Postoperative Follow Up- Hysteroscopy/D&C:  You were seen in office today for postoperative follow up, 2 weeks after your hysteroscopy  At this time you may resume all normal activities  Continue to monitor for recurrence of bleeding and call the office for any worsening/new bleeding, pelvic pain, discharge, itching/burning, rashes, odor  Continue annual gynecologic examinations  If you have any issues call the office [phone] ([place]) or [phone] ([place])

## 2025-03-13 NOTE — PROGRESS NOTES
Subjective   Patient ID: Alda Sullivan is a 34 y.o. female who presents for POST- OP .  HPI    Patient presents for postop visit. She is 2 weeks post hysteroscopy/endometrial ablation. She is doing well and not having pain. She notes her post ablation discharge is tapering. She denies pelvic pain, itching, burning, rash, odor.     Review of Systems   Constitutional: Negative.    Respiratory: Negative.     Cardiovascular: Negative.    Gastrointestinal: Negative.    Genitourinary: Negative.    Musculoskeletal: Negative.    Skin: Negative.    Neurological: Negative.    Psychiatric/Behavioral: Negative.         Objective   Visit Vitals  /60   Wt 72 kg (158 lb 12.8 oz)   BMI 27.26 kg/m²   OB Status Ablation   Smoking Status Never   BSA 1.8 m²       Physical Exam  Constitutional:       Appearance: Normal appearance.   Pulmonary:      Effort: Pulmonary effort is normal.   Genitourinary:     Comments: Vulva normal in appearance without discoloration, rashes, lesions. Vagina is negative for blood, discharge, lesions. Uterus is small, mobile, non tender. There is no cervical motion tenderness, adnexal masses/tenderness.    Neurological:      Mental Status: She is alert.   Psychiatric:         Mood and Affect: Mood normal.         Behavior: Behavior normal.         Assessment/Plan   Problem List Items Addressed This Visit             ICD-10-CM    S/P endometrial ablation - Primary Z98.890     Dicussed findings on exam and at time of surgery  Patient healing well  May return all activities  Precautions given  RTO 1 year PRISCILA Johnson DO 03/13/25 11:01 AM

## 2025-03-21 DIAGNOSIS — E66.9 OBESITY: ICD-10-CM

## 2025-03-21 RX ORDER — SEMAGLUTIDE 2.68 MG/ML
2 INJECTION, SOLUTION SUBCUTANEOUS
Qty: 12 ML | Refills: 3 | Status: SHIPPED | OUTPATIENT
Start: 2025-03-23

## 2025-03-24 ENCOUNTER — PATIENT MESSAGE (OUTPATIENT)
Dept: PRIMARY CARE | Facility: CLINIC | Age: 35
End: 2025-03-24
Payer: COMMERCIAL

## 2025-04-11 ENCOUNTER — APPOINTMENT (OUTPATIENT)
Dept: PRIMARY CARE | Facility: CLINIC | Age: 35
End: 2025-04-11
Payer: COMMERCIAL

## 2025-04-14 ENCOUNTER — APPOINTMENT (OUTPATIENT)
Dept: PRIMARY CARE | Facility: CLINIC | Age: 35
End: 2025-04-14
Payer: COMMERCIAL

## 2025-04-15 DIAGNOSIS — R11.2 NAUSEA AND VOMITING, UNSPECIFIED VOMITING TYPE: Primary | ICD-10-CM

## 2025-04-15 RX ORDER — ONDANSETRON 8 MG/1
8 TABLET, ORALLY DISINTEGRATING ORAL EVERY 8 HOURS PRN
Qty: 20 TABLET | Refills: 0 | Status: SHIPPED | OUTPATIENT
Start: 2025-04-15 | End: 2025-04-22

## 2025-04-28 ENCOUNTER — OFFICE VISIT (OUTPATIENT)
Dept: PRIMARY CARE | Facility: CLINIC | Age: 35
End: 2025-04-28
Payer: COMMERCIAL

## 2025-04-28 VITALS
OXYGEN SATURATION: 97 % | DIASTOLIC BLOOD PRESSURE: 62 MMHG | TEMPERATURE: 97.3 F | RESPIRATION RATE: 18 BRPM | SYSTOLIC BLOOD PRESSURE: 122 MMHG | WEIGHT: 168.4 LBS | HEART RATE: 75 BPM | BODY MASS INDEX: 28.75 KG/M2 | HEIGHT: 64 IN

## 2025-04-28 DIAGNOSIS — E66.9 OBESITY, UNSPECIFIED CLASS, UNSPECIFIED OBESITY TYPE, UNSPECIFIED WHETHER SERIOUS COMORBIDITY PRESENT: Primary | ICD-10-CM

## 2025-04-28 DIAGNOSIS — M25.50 ARTHRALGIA, UNSPECIFIED JOINT: ICD-10-CM

## 2025-04-28 DIAGNOSIS — E66.9 OBESITY, UNSPECIFIED CLASS, UNSPECIFIED OBESITY TYPE, UNSPECIFIED WHETHER SERIOUS COMORBIDITY PRESENT: ICD-10-CM

## 2025-04-28 PROCEDURE — 1036F TOBACCO NON-USER: CPT | Performed by: FAMILY MEDICINE

## 2025-04-28 PROCEDURE — 3008F BODY MASS INDEX DOCD: CPT | Performed by: FAMILY MEDICINE

## 2025-04-28 PROCEDURE — 99213 OFFICE O/P EST LOW 20 MIN: CPT | Performed by: FAMILY MEDICINE

## 2025-04-28 RX ORDER — SEMAGLUTIDE 1.7 MG/.75ML
1.7 INJECTION, SOLUTION SUBCUTANEOUS WEEKLY
Qty: 3 ML | Refills: 0 | Status: SHIPPED | OUTPATIENT
Start: 2025-04-28 | End: 2025-04-29

## 2025-04-28 ASSESSMENT — PATIENT HEALTH QUESTIONNAIRE - PHQ9
2. FEELING DOWN, DEPRESSED OR HOPELESS: NOT AT ALL
SUM OF ALL RESPONSES TO PHQ9 QUESTIONS 1 AND 2: 0
1. LITTLE INTEREST OR PLEASURE IN DOING THINGS: NOT AT ALL

## 2025-04-28 ASSESSMENT — PAIN SCALES - GENERAL: PAINLEVEL_OUTOF10: 0-NO PAIN

## 2025-04-28 NOTE — PROGRESS NOTES
"Subjective   Patient ID: Alda Sullivan is a 34 y.o. female who presents for Joint Swelling.    HPI pt states she has not felt well since stopping the ozempic     Review of Systems    Objective   /62   Pulse 75   Temp 36.3 °C (97.3 °F)   Resp 18   Ht 1.626 m (5' 4\")   Wt 76.4 kg (168 lb 6.4 oz)   SpO2 97%   BMI 28.91 kg/m²     Physical Exam  Constitutional:       General: She is not in acute distress.     Appearance: Normal appearance.   Cardiovascular:      Rate and Rhythm: Normal rate and regular rhythm.      Heart sounds: No murmur heard.  Pulmonary:      Breath sounds: Normal breath sounds. No wheezing.   Neurological:      Mental Status: She is alert.         Assessment/Plan   Problem List Items Addressed This Visit           ICD-10-CM    Obesity - Primary E66.9    Relevant Medications    semaglutide, weight loss, (Wegovy) 1.7 mg/0.75 mL pen injector     Other Visit Diagnoses         Codes      Arthralgia, unspecified joint     M25.50    Relevant Orders    CBC and Auto Differential    Uric Acid    JOSELUIS    Rheumatoid factor    Sedimentation Rate               "

## 2025-04-29 LAB
ANA SER QL IF: NEGATIVE
BASOPHILS # BLD AUTO: 20 CELLS/UL (ref 0–200)
BASOPHILS NFR BLD AUTO: 0.3 %
EOSINOPHIL # BLD AUTO: 59 CELLS/UL (ref 15–500)
EOSINOPHIL NFR BLD AUTO: 0.9 %
ERYTHROCYTE [DISTWIDTH] IN BLOOD BY AUTOMATED COUNT: 11.5 % (ref 11–15)
ERYTHROCYTE [SEDIMENTATION RATE] IN BLOOD BY WESTERGREN METHOD: 2 MM/H
HCT VFR BLD AUTO: 39.2 % (ref 35–45)
HGB BLD-MCNC: 13 G/DL (ref 11.7–15.5)
LYMPHOCYTES # BLD AUTO: 1827 CELLS/UL (ref 850–3900)
LYMPHOCYTES NFR BLD AUTO: 28.1 %
MCH RBC QN AUTO: 31.5 PG (ref 27–33)
MCHC RBC AUTO-ENTMCNC: 33.2 G/DL (ref 32–36)
MCV RBC AUTO: 94.9 FL (ref 80–100)
MONOCYTES # BLD AUTO: 397 CELLS/UL (ref 200–950)
MONOCYTES NFR BLD AUTO: 6.1 %
NEUTROPHILS # BLD AUTO: 4199 CELLS/UL (ref 1500–7800)
NEUTROPHILS NFR BLD AUTO: 64.6 %
PLATELET # BLD AUTO: 252 THOUSAND/UL (ref 140–400)
PMV BLD REES-ECKER: 11.2 FL (ref 7.5–12.5)
RBC # BLD AUTO: 4.13 MILLION/UL (ref 3.8–5.1)
RHEUMATOID FACT SERPL-ACNC: <10 IU/ML
URATE SERPL-MCNC: 3.1 MG/DL (ref 2.5–7)
WBC # BLD AUTO: 6.5 THOUSAND/UL (ref 3.8–10.8)

## 2025-04-29 RX ORDER — SEMAGLUTIDE 1.7 MG/.75ML
1.7 INJECTION, SOLUTION SUBCUTANEOUS WEEKLY
Qty: 3 ML | Refills: 3 | Status: SHIPPED | OUTPATIENT
Start: 2025-04-29 | End: 2025-05-21

## 2025-07-23 ENCOUNTER — TELEMEDICINE (OUTPATIENT)
Dept: PRIMARY CARE | Facility: CLINIC | Age: 35
End: 2025-07-23
Payer: COMMERCIAL

## 2025-07-23 DIAGNOSIS — J01.00 ACUTE NON-RECURRENT MAXILLARY SINUSITIS: Primary | ICD-10-CM

## 2025-07-23 PROCEDURE — 1036F TOBACCO NON-USER: CPT | Performed by: NURSE PRACTITIONER

## 2025-07-23 PROCEDURE — 99214 OFFICE O/P EST MOD 30 MIN: CPT | Performed by: NURSE PRACTITIONER

## 2025-07-23 RX ORDER — AMOXICILLIN AND CLAVULANATE POTASSIUM 875; 125 MG/1; MG/1
1 TABLET, FILM COATED ORAL 2 TIMES DAILY
Qty: 14 TABLET | Refills: 0 | Status: SHIPPED | OUTPATIENT
Start: 2025-07-23 | End: 2025-07-30

## 2025-07-23 ASSESSMENT — LIFESTYLE VARIABLES: HISTORY_OF_SMOKING: I HAVE NEVER SMOKED

## 2025-07-23 ASSESSMENT — ENCOUNTER SYMPTOMS: SINUS COMPLAINT: 1

## 2025-07-23 NOTE — PROGRESS NOTES
Subjective   Patient ID: Alda Sullivan is a 34 y.o. female who presents for Sinus Problem.    Virtual or Telephone Consent    An interactive audio and video telecommunication system which permits real time communications between the patient (at the originating site) and provider (at the distant site) was utilized to provide this telehealth service.   Verbal consent was requested and obtained from Alda Sullivan on this date, 07/23/25 for a telehealth visit and the patient's location was confirmed at the time of the visit.  Patient is located in [place]  DISCLAIMER:   In preparing for this visit and writing this note, I reviewed previous electronic medical records (labs, imaging and medical charts) of the patient available in the physician portal. Significant findings which helped in decision making are recorded in this encounter charting.  All allergies were reviewed with the patient and all medications reconciled with   the patient.    Has had sinus congestion daily for almost 3 months.  Sinus pressure has gotten worse over the past few weeks.  She feels like there is swelling to her sinus and nose.  Causing sinus headaches.  She is have pnd as well.    Tried sudafed and nasal spray without relief  She denies fever, shortness of breath, or chest pain           Sinus Problem         Review of Systems   All other systems reviewed and are negative.      Objective   There were no vitals taken for this visit.    Physical Exam  Constitutional:       General: She is not in acute distress.     Appearance: Normal appearance.   HENT:      Head: Normocephalic and atraumatic.      Right Ear: External ear normal.      Left Ear: External ear normal.      Nose: Nose normal.      Mouth/Throat:      Mouth: Mucous membranes are moist.     Eyes:      Extraocular Movements: Extraocular movements intact.      Conjunctiva/sclera: Conjunctivae normal.      Pupils: Pupils are equal, round, and reactive to light.     Pulmonary:       Effort: Pulmonary effort is normal.     Neurological:      Mental Status: She is alert and oriented to person, place, and time.     Psychiatric:         Mood and Affect: Mood normal.         Behavior: Behavior normal.         Thought Content: Thought content normal.         Judgment: Judgment normal.         Assessment/Plan   Problem List Items Addressed This Visit           ICD-10-CM    Acute non-recurrent maxillary sinusitis - Primary J01.00    - augmentin bid x 7 days  -  Nasal saline, increase fluids  -  hand hygiene and infection prevention discussed  -  Warm compress to sinuses  - humidification  - recommend to eat yogurt twice daily while taking antibiotic or a daily probiotic

## 2025-07-23 NOTE — ASSESSMENT & PLAN NOTE
- augmentin bid x 7 days  -  Nasal saline, increase fluids  -  hand hygiene and infection prevention discussed  -  Warm compress to sinuses  - humidification  - recommend to eat yogurt twice daily while taking antibiotic or a daily probiotic

## (undated) DEVICE — Device

## (undated) DEVICE — PRESSURE INFUSOR, 1000 CC, DISPOSABLE

## (undated) DEVICE — DRESSING, NON-ADHERENT, TELFA, OUCHLESS, 3 X 8 IN, STERILE

## (undated) DEVICE — COVER HANDLE LIGHT, STERIS, BLUE, STERILE